# Patient Record
Sex: MALE | Race: WHITE
[De-identification: names, ages, dates, MRNs, and addresses within clinical notes are randomized per-mention and may not be internally consistent; named-entity substitution may affect disease eponyms.]

---

## 2021-01-23 ENCOUNTER — HOSPITAL ENCOUNTER (EMERGENCY)
Dept: HOSPITAL 57 - BURERS | Age: 86
Discharge: NURSING, REHAB OR LONG-TERM CARE | End: 2021-01-23
Payer: MEDICARE

## 2021-01-23 DIAGNOSIS — K21.9: ICD-10-CM

## 2021-01-23 DIAGNOSIS — Z79.899: ICD-10-CM

## 2021-01-23 DIAGNOSIS — N39.0: Primary | ICD-10-CM

## 2021-01-23 DIAGNOSIS — I25.10: ICD-10-CM

## 2021-01-23 DIAGNOSIS — Z86.73: ICD-10-CM

## 2021-01-23 LAB
BACTERIA UR QL AUTO: (no result) HPF
PROT UR STRIP.AUTO-MCNC: (no result) MG/DL
SP GR UR STRIP: 1.02 (ref 1–1.03)
WBC UR QL AUTO: (no result) HPF (ref 0–3)

## 2021-01-23 PROCEDURE — 81015 MICROSCOPIC EXAM OF URINE: CPT

## 2021-01-23 PROCEDURE — 87077 CULTURE AEROBIC IDENTIFY: CPT

## 2021-01-23 PROCEDURE — 87186 SC STD MICRODIL/AGAR DIL: CPT

## 2021-01-23 PROCEDURE — 87086 URINE CULTURE/COLONY COUNT: CPT

## 2021-01-23 PROCEDURE — 81003 URINALYSIS AUTO W/O SCOPE: CPT

## 2021-01-23 PROCEDURE — 99284 EMERGENCY DEPT VISIT MOD MDM: CPT

## 2021-02-04 ENCOUNTER — HOSPITAL ENCOUNTER (INPATIENT)
Dept: HOSPITAL 92 - ERS | Age: 86
LOS: 5 days | Discharge: HOSPICE | DRG: 280 | End: 2021-02-09
Attending: INTERNAL MEDICINE | Admitting: STUDENT IN AN ORGANIZED HEALTH CARE EDUCATION/TRAINING PROGRAM
Payer: MEDICARE

## 2021-02-04 VITALS — BODY MASS INDEX: 14.9 KG/M2

## 2021-02-04 DIAGNOSIS — Z88.8: ICD-10-CM

## 2021-02-04 DIAGNOSIS — F03.90: ICD-10-CM

## 2021-02-04 DIAGNOSIS — D64.9: ICD-10-CM

## 2021-02-04 DIAGNOSIS — Z86.73: ICD-10-CM

## 2021-02-04 DIAGNOSIS — K21.9: ICD-10-CM

## 2021-02-04 DIAGNOSIS — Z66: ICD-10-CM

## 2021-02-04 DIAGNOSIS — Z51.5: ICD-10-CM

## 2021-02-04 DIAGNOSIS — Z88.0: ICD-10-CM

## 2021-02-04 DIAGNOSIS — Z91.81: ICD-10-CM

## 2021-02-04 DIAGNOSIS — I21.4: Primary | ICD-10-CM

## 2021-02-04 DIAGNOSIS — E44.0: ICD-10-CM

## 2021-02-04 DIAGNOSIS — R53.81: ICD-10-CM

## 2021-02-04 DIAGNOSIS — R64: ICD-10-CM

## 2021-02-04 DIAGNOSIS — Z91.040: ICD-10-CM

## 2021-02-04 DIAGNOSIS — I25.10: ICD-10-CM

## 2021-02-04 DIAGNOSIS — M54.9: ICD-10-CM

## 2021-02-04 DIAGNOSIS — M48.50XA: ICD-10-CM

## 2021-02-04 DIAGNOSIS — G89.29: ICD-10-CM

## 2021-02-04 DIAGNOSIS — Z88.1: ICD-10-CM

## 2021-02-04 DIAGNOSIS — U07.1: ICD-10-CM

## 2021-02-04 DIAGNOSIS — Z95.1: ICD-10-CM

## 2021-02-04 LAB
ALBUMIN SERPL BCG-MCNC: 2.8 G/DL (ref 3.4–4.8)
ALP SERPL-CCNC: 72 U/L (ref 40–110)
ALT SERPL W P-5'-P-CCNC: 36 U/L (ref 8–55)
ANION GAP SERPL CALC-SCNC: 13 MMOL/L (ref 10–20)
APTT PPP: (no result) SEC (ref 22.9–36.1)
APTT PPP: 157.9 SEC (ref 22.9–36.1)
AST SERPL-CCNC: 41 U/L (ref 5–34)
BACTERIA UR QL AUTO: (no result) HPF
BASOPHILS # BLD AUTO: (no result) THOU/UL (ref 0–0.2)
BASOPHILS # BLD AUTO: 0 THOU/UL (ref 0–0.2)
BASOPHILS NFR BLD AUTO: (no result) % (ref 0–1)
BASOPHILS NFR BLD AUTO: 0.2 % (ref 0–1)
BILIRUB SERPL-MCNC: 0.6 MG/DL (ref 0.2–1.2)
BUN SERPL-MCNC: 23 MG/DL (ref 8.4–25.7)
CALCIUM SERPL-MCNC: 7.9 MG/DL (ref 7.8–10.44)
CHLORIDE SERPL-SCNC: 104 MMOL/L (ref 98–107)
CK MB SERPL-MCNC: 1.4 NG/ML (ref 0–6.6)
CK MB SERPL-MCNC: 12.3 NG/ML (ref 0–6.6)
CO2 SERPL-SCNC: 22 MMOL/L (ref 23–31)
CREAT CL PREDICTED SERPL C-G-VRATE: 0 ML/MIN (ref 70–130)
EOSINOPHIL # BLD AUTO: (no result) THOU/UL (ref 0–0.7)
EOSINOPHIL # BLD AUTO: 0.1 THOU/UL (ref 0–0.7)
EOSINOPHIL NFR BLD AUTO: (no result) % (ref 0–10)
EOSINOPHIL NFR BLD AUTO: 0.8 % (ref 0–10)
GLOBULIN SER CALC-MCNC: 3.1 G/DL (ref 2.4–3.5)
GLUCOSE SERPL-MCNC: 112 MG/DL (ref 83–110)
HGB BLD-MCNC: (no result) G/DL (ref 14–18)
HGB BLD-MCNC: 14.4 G/DL (ref 14–18)
INR PPP: 1.1
INR PPP: 2.7
LEUKOCYTE ESTERASE UR QL STRIP.AUTO: 75 LEU/UL
LIPASE SERPL-CCNC: 127 U/L (ref 8–78)
LYMPHOCYTES # BLD: (no result) THOU/UL (ref 1.2–3.4)
LYMPHOCYTES # BLD: 0.9 THOU/UL (ref 1.2–3.4)
LYMPHOCYTES NFR BLD AUTO: (no result) % (ref 21–51)
LYMPHOCYTES NFR BLD AUTO: 7 % (ref 21–51)
MCH RBC QN AUTO: (no result) PG (ref 27–31)
MCH RBC QN AUTO: 32.7 PG (ref 27–31)
MCV RBC AUTO: (no result) FL (ref 78–98)
MCV RBC AUTO: 98.5 FL (ref 78–98)
MONOCYTES # BLD AUTO: (no result) THOU/UL (ref 0.11–0.59)
MONOCYTES # BLD AUTO: 0.9 THOU/UL (ref 0.11–0.59)
MONOCYTES NFR BLD AUTO: (no result) % (ref 0–10)
MONOCYTES NFR BLD AUTO: 6.4 % (ref 0–10)
NEUTROPHILS # BLD AUTO: (no result) THOU/UL (ref 1.4–6.5)
NEUTROPHILS # BLD AUTO: 11.4 THOU/UL (ref 1.4–6.5)
NEUTROPHILS NFR BLD AUTO: (no result) % (ref 42–75)
NEUTROPHILS NFR BLD AUTO: 85.6 % (ref 42–75)
PLATELET # BLD AUTO: (no result) THOU/UL (ref 130–400)
PLATELET # BLD AUTO: 463 THOU/UL (ref 130–400)
POTASSIUM SERPL-SCNC: 4.1 MMOL/L (ref 3.5–5.1)
PROT UR STRIP.AUTO-MCNC: 30 MG/DL
PROTHROMBIN TIME: 14.6 SEC (ref 12–14.7)
PROTHROMBIN TIME: 29.4 SEC (ref 12–14.7)
RBC # BLD AUTO: (no result) MILL/UL (ref 4.7–6.1)
RBC # BLD AUTO: 4.4 MILL/UL (ref 4.7–6.1)
RBC UR QL AUTO: (no result) HPF (ref 0–3)
SODIUM SERPL-SCNC: 135 MMOL/L (ref 136–145)
SP GR UR STRIP: 1.02 (ref 1–1.04)
WBC # BLD AUTO: (no result) THOU/UL (ref 4.8–10.8)
WBC # BLD AUTO: 13.3 THOU/UL (ref 4.8–10.8)
WBC UR QL AUTO: (no result) HPF (ref 0–3)

## 2021-02-04 PROCEDURE — 71275 CT ANGIOGRAPHY CHEST: CPT

## 2021-02-04 PROCEDURE — U0005 INFEC AGEN DETEC AMPLI PROBE: HCPCS

## 2021-02-04 PROCEDURE — 81003 URINALYSIS AUTO W/O SCOPE: CPT

## 2021-02-04 PROCEDURE — 84484 ASSAY OF TROPONIN QUANT: CPT

## 2021-02-04 PROCEDURE — 96375 TX/PRO/DX INJ NEW DRUG ADDON: CPT

## 2021-02-04 PROCEDURE — 83690 ASSAY OF LIPASE: CPT

## 2021-02-04 PROCEDURE — 80048 BASIC METABOLIC PNL TOTAL CA: CPT

## 2021-02-04 PROCEDURE — 80053 COMPREHEN METABOLIC PANEL: CPT

## 2021-02-04 PROCEDURE — 86900 BLOOD TYPING SEROLOGIC ABO: CPT

## 2021-02-04 PROCEDURE — 87635 SARS-COV-2 COVID-19 AMP PRB: CPT

## 2021-02-04 PROCEDURE — 96366 THER/PROPH/DIAG IV INF ADDON: CPT

## 2021-02-04 PROCEDURE — 36415 COLL VENOUS BLD VENIPUNCTURE: CPT

## 2021-02-04 PROCEDURE — 83880 ASSAY OF NATRIURETIC PEPTIDE: CPT

## 2021-02-04 PROCEDURE — 93005 ELECTROCARDIOGRAM TRACING: CPT

## 2021-02-04 PROCEDURE — 82553 CREATINE MB FRACTION: CPT

## 2021-02-04 PROCEDURE — 85025 COMPLETE CBC W/AUTO DIFF WBC: CPT

## 2021-02-04 PROCEDURE — 93010 ELECTROCARDIOGRAM REPORT: CPT

## 2021-02-04 PROCEDURE — 87086 URINE CULTURE/COLONY COUNT: CPT

## 2021-02-04 PROCEDURE — 86850 RBC ANTIBODY SCREEN: CPT

## 2021-02-04 PROCEDURE — 85730 THROMBOPLASTIN TIME PARTIAL: CPT

## 2021-02-04 PROCEDURE — U0003 INFECTIOUS AGENT DETECTION BY NUCLEIC ACID (DNA OR RNA); SEVERE ACUTE RESPIRATORY SYNDROME CORONAVIRUS 2 (SARS-COV-2) (CORONAVIRUS DISEASE [COVID-19]), AMPLIFIED PROBE TECHNIQUE, MAKING USE OF HIGH THROUGHPUT TECHNOLOGIES AS DESCRIBED BY CMS-2020-01-R: HCPCS

## 2021-02-04 PROCEDURE — 96368 THER/DIAG CONCURRENT INF: CPT

## 2021-02-04 PROCEDURE — 85610 PROTHROMBIN TIME: CPT

## 2021-02-04 PROCEDURE — 71045 X-RAY EXAM CHEST 1 VIEW: CPT

## 2021-02-04 PROCEDURE — 87040 BLOOD CULTURE FOR BACTERIA: CPT

## 2021-02-04 PROCEDURE — 51701 INSERT BLADDER CATHETER: CPT

## 2021-02-04 PROCEDURE — 86901 BLOOD TYPING SEROLOGIC RH(D): CPT

## 2021-02-04 PROCEDURE — 96365 THER/PROPH/DIAG IV INF INIT: CPT

## 2021-02-04 PROCEDURE — 84443 ASSAY THYROID STIM HORMONE: CPT

## 2021-02-04 PROCEDURE — 81015 MICROSCOPIC EXAM OF URINE: CPT

## 2021-02-04 NOTE — RAD
PORTABLE CHEST:

2/4/21

 

HISTORY: 

Shortness of breath. 

 

Comparison 5/15/16 study.

 

Heart size is within normal limits. There are atherosclerotic changes of the aorta. Lungs show chroni
c change. No focal infiltrative process or signs of failure. Bones are diffusely demineralized. 

 

IMPRESSION: 

No active intrathoracic disease. 

 

POS: [place]

## 2021-02-04 NOTE — PDOC.HHP
Hospitalist HPI


chest pain


History of Present Illness: 





This is an 89-year-old male patient with a history of dementia, falls, back pa

in, coronary disease status post stent GERD and stroke who was transferred from 

his nursing home this afternoon account of chest pain.





Patient was also recently independent at home however he had a recent fall 

requiring back surgery and due to inability to stay independently was moved to a

nursing home after.  In [facility].





In the nursing home today he complained of chest pain as a result of which EMS 

was activated.


EMS noted he had a large anterior and posterior MI status contacted ED and Cath 

Lab was activated.  He was started on heparin drip route however on presentation

chest pain had resolved and was relatively stable.  Cardiology was consulted and

was reevaluated.  Noted his EKG showed reperfusion and plan was to continue on 

medical management.  He was not sent for Cath Lab.


On discussion with his familyadopted family, patient and family agree that no 

heroic measures will be taken and he would be DNR.





At the time of my evaluation patient was comfortable in bed noted he had pain 

earlier but denied any other movement.  He denied any shortness of breath 

palpitations or weakness.


His initial labs did show hemoglobin of 7 however this was thought to be wrong. 

Repeat labs showed hemoglobin of 14.4, WBC 13.3 and platelets of 436.  Chemistry

showed sodium of 135, bicarb 22, creatinine 0.63, glucose 112 and troponin of 

0.165.  His BNP was 248.  Chest x-ray showed no acute intrathoracic event.  CT a

showed pleural-based nodular densities with repeat CT recommended in 4 months.  

Findings were generally of chronic lung changes with no evidence of PE noted.


In the ED received azithromycin aspirin 300 mg rectally and heparin.


Hospitalist team was consulted to admit





Allergies/Adverse Reactions: 


                                        











Allergy/AdvReac Type Severity Reaction Status Date / Time


 


cephalexin Allergy   Verified 03/23/20 01:52


 


latex Allergy   Verified 03/23/20 01:52


 


levofloxacin [From Levaquin] Allergy   Verified 03/23/20 01:52


 


Penicillins Allergy   Verified 03/23/20 01:52


 


Statins-Hmg-Coa Reductase Allergy   Verified 02/05/21 03:10





Inhibitor     


 


tramadol Allergy   Verified 02/05/21 02:38











Home Medications: 


                                        











 Medication  Instructions  Recorded  Confirmed  Type


 


Acetaminophen W/ Codeine 1 tab PO Q4H PRN 02/05/21 02/05/21 History





[Acetaminophen/Codeine #3]    


 


Ascorbic Acid [Vitamin C] 1 tab PO DAILY 02/05/21 02/05/21 History


 


Cholecalciferol [Vitamin D3] 1 cap PO DAILY 02/05/21 02/05/21 History


 


Cyanocobalamin (Vitamin B-12) 2 tab PO DAILY 02/05/21 02/05/21 History





[B-12]    


 


Docusate [Colace] 1 cap PO DAILY 02/05/21 02/05/21 History


 


Lactobacillus [Floranex] 1 tab PO DAILY 02/05/21 02/05/21 History


 


Omeprazole 20 mg PO DAILY 02/05/21 02/05/21 History


 


Vitamin E 400 units PO DAILY 02/05/21 02/05/21 History


 


tiZANidine HCl [Tizanidine HCl] 4 mg PO Q6HR PRN 02/05/21 02/05/21 History











Past History: 


PMHx:dementia, falls, back pain, coronary disease status post stent GERD and 

stroke 





PSHx:Bardiac stent, back surgery   





FHx:None of significance   





Social:Leaves in nursing facility, no smoking, alcohol or drug history








Hospitalist HPI ROS


Constitutional: denies: fever, chills, sweats


Respiratory: denies: cough, shortness of breath, hemoptysis, SOB with excertion


Cardiovascular: reports: chest pain.  denies: palpitations, orthopnea


Gastrointestinal: denies: nausea (Since resolved), vomiting, abdominal pain


Musculoskeletal: reports: back pain, leg pain.  denies: neck pain, shoulder pain


Neurological: denies: weakness, numbness, incoordination


All other systems reviewed; all pertinent +/- noted in HPI/Subj





Hospitalist Exam


General Appearance: awake alert


Eye: PERRL, anicteric sclera


Heart: RRR, no murmur, no gallops, no rubs


Respiratory: CTAB, no wheezes, no rales, no ronchi


Extremities: no cyanosis, no clubbing, no edema


Neurological: cranial nerve grossly intact, no focal deficits


Psychiatric: normal affect, A&O x 3


Psychiatric - other findings: Sometimes slightly disoriented however





Hospitalist Results


Result Diagrams: 


                                 02/07/21 02:53





                                 02/07/21 02:53


Lab results: 


                             Laboratory Last Values











WBC  5.2 thou/uL (4.8-10.8)   02/04/21  17:04    


 


RBC  2.17 mill/uL (4.70-6.10)  L  02/04/21  17:04    


 


Hgb  7.0 g/dL (14.0-18.0)  L  02/04/21  17:04    


 


Hct  21.2 % (42.0-52.0)  L  02/04/21  17:04    


 


MCV  98.0 fL (78.0-98.0)   02/04/21  17:04    


 


MCH  32.3 pg (27.0-31.0)  H  02/04/21  17:04    


 


MCHC  32.9 g/dL (32.0-36.0)   02/04/21  17:04    


 


RDW  13.0 % (11.5-14.5)   02/04/21  17:04    


 


Plt Count  243 thou/uL (130-400)   02/04/21  17:04    


 


MPV  7.4 fL (7.4-10.4)   02/04/21  17:04    


 


Neutrophils %  67.5 % (42.0-75.0)   02/04/21  17:04    


 


Lymphocytes %  21.2 % (21.0-51.0)   02/04/21  17:04    


 


Monocytes %  9.8 % (0.0-10.0)   02/04/21  17:04    


 


Eosinophils %  1.2 % (0.0-10.0)   02/04/21  17:04    


 


Basophils %  0.3 % (0.0-1.0)   02/04/21  17:04    


 


Neutrophils #  3.5 thou/uL (1.40-6.50)   02/04/21  17:04    


 


Lymphocytes #  1.1 thou/uL (1.20-3.40)  L  02/04/21  17:04    


 


Monocytes #  0.5 thou/uL (0.11-0.59)   02/04/21  17:04    


 


Eosinophils #  0.1 thou/uL (0.0-0.7)   02/04/21  17:04    


 


Basophils #  0.0 thou/uL (0.0-0.2)   02/04/21  17:04    


 


PT  29.4 sec (12.0-14.7)  H  02/04/21  17:04    


 


INR  2.7   02/04/21  17:04    


 


APTT  Greater than  250.0 sec (22.9-36.1)  H*  02/04/21  17:04    


 


Sodium  135 mmol/L (136-145)  L  02/04/21  17:04    


 


Potassium  4.1 mmol/L (3.5-5.1)   02/04/21  17:04    


 


Chloride  104 mmol/L ()   02/04/21  17:04    


 


Carbon Dioxide  22 mmol/L (23-31)  L  02/04/21  17:04    


 


Anion Gap  13 mmol/L (10-20)   02/04/21  17:04    


 


BUN  23 mg/dL (8.4-25.7)   02/04/21  17:04    


 


Creatinine  0.63 mg/dL (0.7-1.3)  L  02/04/21  17:04    


 


Estimated GFR (MDRD)  Greater than  90   02/04/21  17:04    


 


Glucose  112 mg/dL ()  H  02/04/21  17:04    


 


Calcium  7.9 mg/dL (7.8-10.44)   02/04/21  17:04    


 


Total Bilirubin  0.6 mg/dL (0.2-1.2)   02/04/21  17:04    


 


AST  41 U/L (5-34)  H  02/04/21  17:04    


 


ALT  36 U/L (8-55)   02/04/21  17:04    


 


Alkaline Phosphatase  72 U/L ()   02/04/21  17:04    


 


CK-MB (CK-2)  1.4 ng/mL (0-6.6)   02/04/21  17:04    


 


Troponin I  0.165 ng/mL (< 0.028)  H  02/04/21  17:04    


 


B-Natriuretic Peptide  44.9 pg/mL (0-100)   02/04/21  17:04    


 


Serum Total Protein  5.9 g/dL (5.8-8.1)   02/04/21  17:04    


 


Albumin  2.8 g/dL (3.4-4.8)  L  02/04/21  17:04    


 


Globulin  3.1 g/dL (2.4-3.5)   02/04/21  17:04    


 


Albumin/Globulin Ratio  0.9 g/dL (1.2-2.2)  L  02/04/21  17:04    


 


Lipase  127 U/L (8-78)  H  02/04/21  17:04    


 


TSH 3rd Generation  1.2399 uIU/mL (0.35-4.94)   02/04/21  17:04    


 


Urine Color  Yellow  (Yellow)   02/04/21  17:14    


 


Urine Clarity  Clear  (Clear)   02/04/21  17:14    


 


Urine pH  6.0  (5.0-9.0)   02/04/21  17:14    


 


Ur Specific Gravity  1.025  (1.002-1.036)   02/04/21  17:14    


 


Urine Protein  30 mg/dL (Neg-Trace)  A  02/04/21  17:14    


 


Urine Glucose (UA)  Normal mg/dL (Negative)   02/04/21  17:14    


 


Urine Ketones  Negative mg/dL (Negative)   02/04/21  17:14    


 


Urine Blood  Negative  (Negative)   02/04/21  17:14    


 


Urine Nitrite  Negative  (Negative)   02/04/21  17:14    


 


Urine Bilirubin  Negative  (Negative)   02/04/21  17:14    


 


Urine Urobilinogen  2.0 mg/dL (Less than 2)  A  02/04/21  17:14    


 


Ur Leukocyte Esterase  75 Alice/uL (Negative)  A  02/04/21  17:14    


 


Urine RBC  4-6 HPF (0-3)  A  02/04/21  17:14    


 


Urine WBC  7-10 HPF (0-3)  A  02/04/21  17:14    


 


Ur Squamous Epith Cells  0-3 HPF (0-3)   02/04/21  17:14    


 


Urine Bacteria  1+ HPF (None Seen)  A  02/04/21  17:14    


 


Hyaline Casts  4-6 LPF (0-3)  A  02/04/21  17:14    


 


Blood Type  B POSITIVE   02/04/21  18:14    


 


Antibody Screen  NEGATIVE   02/04/21  18:05    


 


Crossmatch  See Detail   02/04/21  18:05    














Hospitalist H&P A/P


Plan: 





This is an 89-year-old male patient with a history of coronary artery disease, 

chronic low back pain who presents with chest pain diagnostic of MI.





STEMI


Continue on heparin


Received aspirinwe will continue


Monitoring telemetry


Cardiology following





-Chronic back pain


s/p recent back surgery


Current medications


PT





Dementia





Chronic debility


Patient has chronic health problems including dementia


Have PT evaluate








CODE STATUSDNR


DVT prophylaxistherapeutic level

## 2021-02-04 NOTE — PRG
DATE OF SERVICE:  02/04/2021



I spoke with friends, who are really functioning as Mr. Murrieta's family and also

have power-of-.  The patient's friends discussed that Mr. Murrieta has been

going downhill the last few months, losing weight.  He is in a nursing home.  He

tried to go to assisted living, but he was unable do that and had to go back to the

nursing home.  She indicates that the best options would be more comfort care.  The

patient is unable to really tell us about code status, right now is confused,

disoriented, but the friends indicated that the code status would more likely be in

line with the comfort care.  She did not think that he would want chest compressions

or intubation or aggressive care and in terms of that, we will try to treat him

aggressively medically. 







Job ID:  [id number]

## 2021-02-04 NOTE — CT
CT ANGIOGRAM THORAX WITH IV CONTRAST AND 3-D RECONSTRUCTIONS



CLINICAL INDICATION:

Right-sided chest pain/indigestion which started this morning. Abnormal EKG. 



COMPARISON:

None



FINDINGS:

Pulmonary arteries: No filling defects are seen in the pulmonary arteries to suggest a pulmonary embo
chuy.





Aorta: Not well opacified, the thoracic aorta is normal in caliber and tortuous. Vascular calcificati
ons are seen in the thoracic aorta.



Lungs: Biapical pleural parenchymal scarring is present which is partially calcified. Scattered linea
r densities are seen posteriorly which may be related to atelectasis or scarring.



There is a prominent pleural-based nodular density measuring 11 mm adjacent to the upper portion of t
he major fissure in the right upper lobe. There are also a few mildly prominent pleural-based

nodular densities along the major fissure further inferiorly largest measuring approximately 10 mm. A
djacent reticulonodular densities are seen adjacent to the pleural-based nodular densities lower

aspect of the major fissure. Exact etiology for these nodular densities is uncertain and short interv
al follow-up is recommended. Small nodular density is seen along the left major fissure measuring

0.8 cm and may represent a small intrapleural lymph node.



No pleural effusion is identified.



Mediastinum: No enlarged mediastinal lymph nodes are seen by CT size criteria. Few small calcified me
diastinal lymph nodes are present. Prominent coronary artery calcifications are visualized.



Thyroid gland: Grossly normal in appearance.



Osseous structures: Multilevel degenerative changes in the thoracic spine with vertebroplasty changes
 seen involving mid thoracic vertebral bodies. Mild wedge-shaped compression fractures are seen

involving the T10, T12, and L1 vertebral bodies of indeterminate age, these fractures were probably p
resent on radiographs in 2017.



Chest wall: No abnormality visualized.





Upper abdomen: Prominent vascular calcifications are seen in the region of the vascular structures in
 the upper abdomen. 



IMPRESSION:



1. Pleural-based nodular densities along the major fissure which cannot be characterized as a intrapl
eural lymph nodes. Follow-up CT thorax in 4 months is recommended.

2. Scattered mildly prominent interstitial densities are seen posteriorly in the right lower lobe and
 lateral aspect of the right upper lobe which could relate to mild chronic lung changes, but

infectious or inflammatory process is a possibility.

3. Mild chronic lung changes.

4. No CT evidence of a pulmonary embolus.

5. Prominent vascular calcifications. 

6. Remote wedge-shaped compression fractures lower thoracic spine and involving the L1 vertebral body
.



Reported By: Juvenal Brumfield 

Electronically Signed:  2/4/2021 6:10 PM

## 2021-02-05 LAB
ANION GAP SERPL CALC-SCNC: 14 MMOL/L (ref 10–20)
BASOPHILS # BLD AUTO: 0 THOU/UL (ref 0–0.2)
BASOPHILS NFR BLD AUTO: 0.3 % (ref 0–1)
BUN SERPL-MCNC: 20 MG/DL (ref 8.4–25.7)
CALCIUM SERPL-MCNC: 8.6 MG/DL (ref 7.8–10.44)
CHLORIDE SERPL-SCNC: 104 MMOL/L (ref 98–107)
CO2 SERPL-SCNC: 23 MMOL/L (ref 23–31)
CREAT CL PREDICTED SERPL C-G-VRATE: 65 ML/MIN (ref 70–130)
EOSINOPHIL # BLD AUTO: 0.1 THOU/UL (ref 0–0.7)
EOSINOPHIL NFR BLD AUTO: 0.7 % (ref 0–10)
GLUCOSE SERPL-MCNC: 99 MG/DL (ref 83–110)
HGB BLD-MCNC: 15 G/DL (ref 14–18)
LYMPHOCYTES # BLD: 1.6 THOU/UL (ref 1.2–3.4)
LYMPHOCYTES NFR BLD AUTO: 17 % (ref 21–51)
MCH RBC QN AUTO: 32.1 PG (ref 27–31)
MCV RBC AUTO: 96.3 FL (ref 78–98)
MONOCYTES # BLD AUTO: 0.8 THOU/UL (ref 0.11–0.59)
MONOCYTES NFR BLD AUTO: 8.8 % (ref 0–10)
NEUTROPHILS # BLD AUTO: 6.9 THOU/UL (ref 1.4–6.5)
NEUTROPHILS NFR BLD AUTO: 73.2 % (ref 42–75)
PLATELET # BLD AUTO: 503 THOU/UL (ref 130–400)
POTASSIUM SERPL-SCNC: 3.9 MMOL/L (ref 3.5–5.1)
RBC # BLD AUTO: 4.68 MILL/UL (ref 4.7–6.1)
SARS-COV-2 RNA RESP QL NAA+PROBE: DETECTED
SODIUM SERPL-SCNC: 137 MMOL/L (ref 136–145)
WBC # BLD AUTO: 9.5 THOU/UL (ref 4.8–10.8)

## 2021-02-05 RX ADMIN — ASPIRIN SCH MG: 81 TABLET ORAL at 08:16

## 2021-02-05 NOTE — PDOC.HOSPP
- Subjective


Encounter Date: 02/05/21


Encounter Time: 13:58


Subjective: 


Mr. Murrieta was seen today in follow-up of NSTEMI. He does not have any 

complaints. He denies chest pain or trouble breathing. He says he just wants to 

get out of this hospital and be with family.





- Objective


Vital Signs & Weight: 


                             Vital Signs (12 hours)











  Temp Pulse Resp BP Pulse Ox


 


 02/05/21 12:15  96.7 F L  69  18  112/57 L  100


 


 02/05/21 08:20  97.8 F  76  16  139/70  100


 


 02/05/21 08:16      100


 


 02/05/21 04:00  98.3 F  62  16  142/70 H  100








                                     Weight











Admit Weight                   113 lb


 


Weight                         113 lb














Result Diagrams: 


                                 02/05/21 02:11





                                 02/05/21 02:11





Hospitalist ROS





- Medication


Medications: 


Active Medications











Generic Name Dose Route Start Last Admin





  Trade Name Freq  PRN Reason Stop Dose Admin


 


Aspirin  81 mg  02/05/21 09:00  02/05/21 08:16





  Aspirin 81 Mg Enteric Coated Tablet  PO   81 mg





  DAILY ERIKA   Administration


 


Enoxaparin Sodium  50 mg  02/05/21 09:00  02/05/21 09:40





  Enoxaparin Sodium 60 Mg/0.6 Ml Syringe  SC   50 mg





  0900,2100 ERIKA   Administration


 


Famotidine  20 mg  02/05/21 09:00  02/05/21 09:40





  Famotidine 20 Mg Tab  PO   20 mg





  BID ERIKA   Administration














Hospitalist Exam


Vitals: 


                             Vital Signs (12 hours)











  Temp Pulse Resp BP Pulse Ox


 


 02/05/21 12:15  96.7 F L  69  18  112/57 L  100


 


 02/05/21 08:20  97.8 F  76  16  139/70  100


 


 02/05/21 08:16      100


 


 02/05/21 04:00  98.3 F  62  16  142/70 H  100








                                     Weight











Admit Weight                   113 lb


 


Weight                         113 lb














General Appearance: NAD


General - other findings: very thin and frail in appearance


Eye: PERRL, anicteric sclera


Heart: RRR, no murmur, no gallops, no rubs, normal peripheral pulses


Respiratory: CTAB, no wheezes, no rales, no ronchi, normal chest expansion, no 

tachypnea, normal percussion


Gastrointestinal: soft, non-tender, non-distended, normal bowel sounds, no 

palpable masses


Extremities: no cyanosis, no edema





Hosp A/P


(1) NSTEMI (non-ST elevated myocardial infarction)


Code(s): I21.4 - NON-ST ELEVATION (NSTEMI) MYOCARDIAL INFARCTION   Status: Acute

  





(2) CAD (coronary artery disease)


Code(s): I25.10 - ATHSCL HEART DISEASE OF NATIVE CORONARY ARTERY W/O ANG PCTRS  

Status: Chronic   





(3) Compression fracture of vertebra


Code(s): M48.50XA - COLLAPSED VERTEBRA, NEC, SITE UNSP, INIT   Status: Chronic  







(4) Physical deconditioning


Code(s): R53.81 - OTHER MALAISE   Status: Chronic   





- Plan





* NSTEMI and CAD- agree with conservative management, given his advanced age and

  frail status


* Continue Plavix aspirin Heparin , and consider adding a beta-blocker


* Chronic back pain- from vertebral fractures- continue conservative management


* Palliative Care Consult

## 2021-02-05 NOTE — CON
DATE OF CONSULTATION:  02/04/2021



REASON FOR CONSULTATION:  Acute myocardial infarction.



HISTORY OF PRESENT ILLNESS:  Mr. Murrieat is an 89-year-old gentleman.  He was

brought from a local nursing home.  The patient apparently was recently diagnosed

with COVID near the end of last month by one verbal report, it was from the 25th and

today is the 4th of February.  The patient was found to be having an acute

myocardial infarction and was brought to this institution.  The initial EKG showed

severe ST-elevation.  He was given heparin intravenously.  The ST segments have gone

back to baseline after he received heparin.  The patient is not having chest pain

now.  This "hurts all over." 



The past history is obtained mostly from the medical records.  He is unable to give

me much history.  He says he just hurts all over.  The previous notes indicate that

he does have coronary artery disease with a stent placed in 2011.  The details of

that are not available.  Also has a history of "small stroke". 



His overall health is unknown to me.  There is also a history of a compression

fracture. 



He was recently in the emergency room with urinary tract infection, which was

treated. 



Medications at this point are not known.



SOCIAL HISTORY:  The patient tells me he does not have any family, "he is a [place]

war  that is all he tells me." 



ALLERGIES:  TO CEPHALEXIN, LATEX, LEVOFLOXACIN, AND PENICILLIN.



PHYSICAL EXAMINATION:

GENERAL:  This is a really very underweight, cachectic appearing gentleman, looks

chronically ill. 

VITAL SIGNS:  His blood pressure was in the 100 systolic range.  His pulse in the

80s. 

NECK:  Veins are not distended. 

LUNGS:  Clear anteriorly, laterally. 

CARDIAC:  I do not hear murmur, rub, or gallop. 

ABDOMEN:  Underweight. 

EXTREMITIES:  Warm and dry.  There is no clubbing, cyanosis, or edema.  He does have

femoral pulses. 



PERTINENT LABORATORY DATA:  His hemoglobin is 7, it was recently 16 just a few days

ago. 



EKG showed ST-elevation initially in inferior walls with ST-depression in the

anterior walls compatible with a large infarction.  Follow up EKG shows marked

improvement in the ST segments. 



ASSESSMENT:  

1. Acute myocardial infarction, it appears likely that he reperfused with heparin.

2. Severe anemia.

3. Recent COVID infection.

4. Appears cachectic, appears chronically ill.



PLAN:  During this dictation actually it has just been texted if there is a family

available, can get further guidance about code status. 



He is on heparin. 



He will receive packed red blood cells. 



Prognosis guarded to poor in this unfortunate elderly gentleman with multiple

medical problems. 







Job ID:  [id number]

## 2021-02-05 NOTE — PQF
CLINICAL DOCUMENTATION CLARIFICATION FORM:









Dear Dr. MEGA MONTOYA                     Date: 2-5-21

Please exercise your independent, professional judgment in responding to the 
clarification form. 

Clinical indicators are provided on the bottom of this form for your review.



Please check appropriate box(es):

[X  ] Protein Calorie Malnutrition:    [  ] Mild      [ X ] Moderate   [  ] 
Severe   

[  ] Other Malnutrition (please specify) 
_________________________________________

[  ] Other diagnosis ___________

[  ] Unable to determine



In addition, please specify:

Present on Admission (POA):  [ X] Yes             [  ] No             [  ] 
Unable to determine



For continuity of documentation, please document condition throughout progress 
notes and discharge summary.  Thank You.



To be completed by CDI/Coding staff for physician review: 

CLINICAL INDICATORS - SIGNS / SYMPTOMS / LABS    / RESULTS AND LOCATION IN MR:



CONSULT NOTE DR. JOHNSON 2-4-21:  HAS BEEN [place] [facility] THE LAST FEW MONTHS, 
LOSING WEIGHT.  HE IS IN A NURSING HOME 



CONSULT NOTE DR. JOHNSON 2-4-21:   ACUTE MI, SEVERE ANEMIA,  RECENT COVID 
INFECTION, APPEARS CACHECTIC, APPEARS CHRONICALLY ILL.



DIETICIAN CONSULT 2-5-21:   Patient is COVID+. Ns home resident with dementia, 
and A&Ox3. 

Per admit records, noted 10% weight loss in 4 years. No recent wt hx documented 
to compare.



DIETICIAN CONSULT 2-5-21:   BMI:   14.9



RISK FACTORS    / RESULTS AND LOCATION IN MR:



CONSULT NOTE DR. JOHNSON 2-4-21:  HAS BEEN [facility] [facility] THE LAST FEW MONTHS, 
LOSING WEIGHT.  HE IS IN A NURSING HOME



TREATMENT    / RESULTS AND LOCATION IN MR:



DIETICIAN CONSULT 2-5-21:   



1. Recommend liberalizing diet to Regular diet to encourage PO intake, Heart 
Healthy diet is appropriate long term.

2. Recommend Ensure Enlive TID.

3. Monitor labs and electrolytes.



Moderate Malnutrition (in acute illness)

   Energy Intake: <75% of estimated energy requirement for > 7 days

   Weight Loss:  1-2%/1 week; 5%/ 1 month; 7.5%/3 months

   Other: mild body fat loss; mild muscle mass loss; mild fluid accumulation; 

Severe Malnutrition (in acute illness)

   Energy Intake: = 50% of estimated energy requirement for = 5 days

   Weight Loss: >2%/1 week; >5%/1 month; >7.5%/3 months

   Other: moderate body fat loss; moderate muscle mass loss; moderate- severe 
fluid accumulation; measurably reduced  strength

Moderate Malnutrition (in chronic illness)

   Energy Intake: <75% of estimated energy requirement for =1 month

   Weight Loss: 5%/1 month; 7.5%/3 months; 10%/6 months; 20%/1 year

   Other: mild body fat loss; mild muscle mass loss; mild fluid accumulation

Severe Malnutrition (in chronic illness)

   Energy Intake: =75% of estimated energy requirement for =1 month

   Weight Loss: >5%/1 month; >7.5%/3 months; >10%/6 months; >20%/1 year

   Other: severe body fat loss; severe muscle mass loss; severe fluid 
accumulation; measurably reduced  strength





CDS Signature:   Maile Paulino       Phone #:  [phone]            Date: 
2-5-21



                 This is a permanent part of the Medical Record

[facility]

## 2021-02-06 RX ADMIN — ASPIRIN SCH MG: 81 TABLET ORAL at 07:59

## 2021-02-06 NOTE — PDOC.HOSPP
- Subjective


Encounter Date: 02/06/21


Subjective: 


seems to be doing well, he was working with PT.





- Objective


Vital Signs & Weight: 


                             Vital Signs (12 hours)











  Temp Pulse Resp BP Pulse Ox


 


 02/06/21 08:00  96.5 F L  71  19  167/81 H  97


 


 02/06/21 03:54  97.6 F  66  14  141/73 H  100


 


 02/06/21 00:00  97.1 F L  70  16  136/61  97








                                     Weight











Admit Weight                   113 lb


 


Weight                         113 lb














I&O: 


                                        











 02/05/21 02/06/21 02/07/21





 06:59 06:59 06:59


 


Intake Total  540 


 


Output Total  1125 


 


Balance  -585 











Result Diagrams: 


                                 02/05/21 02:11





                                 02/05/21 02:11





Hospitalist ROS





- Medication


Medications: 


Active Medications











Generic Name Dose Route Start Last Admin





  Trade Name Freq  PRN Reason Stop Dose Admin


 


Aspirin  81 mg  02/05/21 09:00  02/06/21 07:59





  Aspirin 81 Mg Enteric Coated Tablet  PO   81 mg





  DAILY ERIKA   Administration


 


Carvedilol  1.56 mg  02/05/21 17:00  02/06/21 07:58





  Carvedilol 3.125 Mg Tab  PO   1.56 mg





  BID-WM ERIKA   Administration


 


Clopidogrel Bisulfate  75 mg  02/06/21 09:00  02/06/21 07:59





  Clopidogrel Bisulfate 75 Mg Tab  PO   75 mg





  DAILY ERIKA   Administration


 


Enoxaparin Sodium  50 mg  02/05/21 09:00  02/06/21 07:58





  Enoxaparin Sodium 60 Mg/0.6 Ml Syringe  SC   50 mg





  0900,2100 ERIKA   Administration


 


Famotidine  20 mg  02/05/21 09:00  02/06/21 07:59





  Famotidine 20 Mg Tab  PO   20 mg





  BID ERIKA   Administration














Hospitalist Exam


Vitals: 


                             Vital Signs (12 hours)











  Temp Pulse Resp BP Pulse Ox


 


 02/06/21 08:00  96.5 F L  71  19  167/81 H  97


 


 02/06/21 03:54  97.6 F  66  14  141/73 H  100


 


 02/06/21 00:00  97.1 F L  70  16  136/61  97








                                     Weight











Admit Weight                   113 lb


 


Weight                         113 lb














General Appearance: NAD


Eye: PERRL


ENT: normocephalic atraumatic, no oropharyngeal lesions


Neck: supple, symmetric, no JVD


Heart: RRR, no murmur, no gallops


Respiratory: CTAB, no wheezes, no rales, no ronchi


Extremities: no cyanosis


Skin: normal turgor





Hosp A/P


(1) NSTEMI (non-ST elevated myocardial infarction)


Code(s): I21.4 - NON-ST ELEVATION (NSTEMI) MYOCARDIAL INFARCTION   Status: Acute

  





(2) CAD (coronary artery disease)


Code(s): I25.10 - ATHSCL HEART DISEASE OF NATIVE CORONARY ARTERY W/O ANG PCTRS  

Status: Chronic   





(3) Compression fracture of vertebra


Code(s): M48.50XA - COLLAPSED VERTEBRA, NEC, SITE UNSP, INIT   Status: Chronic  







(4) Physical deconditioning


Code(s): R53.81 - OTHER MALAISE   Status: Chronic   





- Plan





plan for today 2/6





patient seemed to be doing better, he is on Lovenox BID, coreg, ASA and Plavix.


BP a bit up, I will keep on monitoring.


we will continue to follow Cardiology recommendation.

## 2021-02-06 NOTE — EKG
Test Reason : 

Blood Pressure : ***/*** mmHG

Vent. Rate : 073 BPM     Atrial Rate : 073 BPM

   P-R Int : 150 ms          QRS Dur : 090 ms

    QT Int : 422 ms       P-R-T Axes : 036 021 072 degrees

   QTc Int : 464 ms

 

Normal sinus rhythm

Low voltage QRS

Cannot rule out Anterior infarct , age undetermined

Abnormal ECG

 

Confirmed by ROHINI MENARD ([id number]),  DESIRAE MONROE ([id number]) on 2/6/2021 6:40:25 PM

 

Referred By:             Confirmed By:ROHINI MENARD

## 2021-02-07 LAB
ANION GAP SERPL CALC-SCNC: 11 MMOL/L (ref 10–20)
BASOPHILS # BLD AUTO: 0.1 THOU/UL (ref 0–0.2)
BASOPHILS NFR BLD AUTO: 1 % (ref 0–1)
BUN SERPL-MCNC: 16 MG/DL (ref 8.4–25.7)
CALCIUM SERPL-MCNC: 8.8 MG/DL (ref 7.8–10.44)
CHLORIDE SERPL-SCNC: 103 MMOL/L (ref 98–107)
CO2 SERPL-SCNC: 26 MMOL/L (ref 23–31)
COMM CRITICAL RESULTS DOC: (no result)
CREAT CL PREDICTED SERPL C-G-VRATE: 64 ML/MIN (ref 70–130)
EOSINOPHIL # BLD AUTO: 0.2 THOU/UL (ref 0–0.7)
EOSINOPHIL NFR BLD AUTO: 3.4 % (ref 0–10)
GLUCOSE SERPL-MCNC: 92 MG/DL (ref 83–110)
HGB BLD-MCNC: 15.5 G/DL (ref 14–18)
HGB BLD-MCNC: 15.8 G/DL (ref 14–18)
LYMPHOCYTES # BLD: 1.2 THOU/UL (ref 1.2–3.4)
LYMPHOCYTES NFR BLD AUTO: 18.1 % (ref 21–51)
MCH RBC QN AUTO: 31.7 PG (ref 27–31)
MCV RBC AUTO: 96.4 FL (ref 78–98)
MONOCYTES # BLD AUTO: 0.9 THOU/UL (ref 0.11–0.59)
MONOCYTES NFR BLD AUTO: 12.8 % (ref 0–10)
NEUTROPHILS # BLD AUTO: 4.4 THOU/UL (ref 1.4–6.5)
NEUTROPHILS NFR BLD AUTO: 64.7 % (ref 42–75)
PLATELET # BLD AUTO: 531 THOU/UL (ref 130–400)
PLATELET # BLD AUTO: 544 THOU/UL (ref 130–400)
POTASSIUM SERPL-SCNC: 4.7 MMOL/L (ref 3.5–5.1)
RBC # BLD AUTO: 4.88 MILL/UL (ref 4.7–6.1)
SODIUM SERPL-SCNC: 135 MMOL/L (ref 136–145)
TROPONIN I SERPL DL<=0.01 NG/ML-MCNC: 1.13 NG/ML (ref ?–0.03)
WBC # BLD AUTO: 6.8 THOU/UL (ref 4.8–10.8)

## 2021-02-07 RX ADMIN — ASPIRIN SCH MG: 81 TABLET ORAL at 09:45

## 2021-02-07 NOTE — PDOC.HOSPP
- Subjective


Encounter Date: 02/07/21


Subjective: 


feels well





- Objective


Vital Signs & Weight: 


                             Vital Signs (12 hours)











  Temp Pulse Resp BP Pulse Ox


 


 02/07/21 12:00  97.3 F L  80  18  138/78  96


 


 02/07/21 07:54  95.9 F L  80  22 H  134/60  100


 


 02/07/21 04:00  98.5 F  70  16  136/67  98








                                     Weight











Admit Weight                   113 lb


 


Weight                         113 lb














I&O: 


                                        











 02/06/21 02/07/21 02/08/21





 06:59 06:59 06:59


 


Intake Total 540 540 


 


Output Total 1125 450 


 


Balance -585 90 











Result Diagrams: 


                                 02/07/21 02:53





                                 02/07/21 02:53





Hospitalist ROS





- Medication


Medications: 


Active Medications











Generic Name Dose Route Start Last Admin





  Trade Name Freq  PRN Reason Stop Dose Admin


 


Aspirin  81 mg  02/05/21 09:00  02/07/21 09:45





  Aspirin 81 Mg Enteric Coated Tablet  PO   81 mg





  DAILY ERIKA   Administration


 


Carvedilol  1.56 mg  02/05/21 17:00  02/07/21 09:44





  Carvedilol 3.125 Mg Tab  PO   1.56 mg





  BID-WM ERIKA   Administration


 


Clopidogrel Bisulfate  75 mg  02/06/21 09:00  02/07/21 09:45





  Clopidogrel Bisulfate 75 Mg Tab  PO   75 mg





  DAILY ERIKA   Administration


 


Enoxaparin Sodium  50 mg  02/05/21 09:00  02/07/21 09:45





  Enoxaparin Sodium 60 Mg/0.6 Ml Syringe  SC  02/08/21 09:00  50 mg





  0900,2100 ERIKA   Administration


 


Famotidine  20 mg  02/05/21 09:00  02/07/21 09:44





  Famotidine 20 Mg Tab  PO   20 mg





  BID ERIKA   Administration














Hospitalist Exam


Vitals: 


                             Vital Signs (12 hours)











  Temp Pulse Resp BP Pulse Ox


 


 02/07/21 12:00  97.3 F L  80  18  138/78  96


 


 02/07/21 07:54  95.9 F L  80  22 H  134/60  100


 


 02/07/21 04:00  98.5 F  70  16  136/67  98








                                     Weight











Admit Weight                   113 lb


 


Weight                         113 lb














General Appearance: NAD


Eye: PERRL, anicteric sclera


ENT: normocephalic atraumatic, no oropharyngeal lesions


Neck: supple, symmetric, no JVD, no thyromegaly


Heart: RRR, no murmur, no gallops, no rubs


Respiratory: CTAB, no wheezes, no rales, no ronchi


Gastrointestinal: soft, non-tender, non-distended


Extremities: no cyanosis, no clubbing





Hosp A/P


(1) NSTEMI (non-ST elevated myocardial infarction)


Code(s): I21.4 - NON-ST ELEVATION (NSTEMI) MYOCARDIAL INFARCTION   Status: Acute

  





(2) CAD (coronary artery disease)


Code(s): I25.10 - ATHSCL HEART DISEASE OF NATIVE CORONARY ARTERY W/O ANG PCTRS  

Status: Chronic   





(3) Compression fracture of vertebra


Code(s): M48.50XA - COLLAPSED VERTEBRA, NEC, SITE UNSP, INIT   Status: Chronic  







(4) Physical deconditioning


Code(s): R53.81 - OTHER MALAISE   Status: Chronic   





- Plan





plan for today 2/6





patient seemed to be doing better, he is on Lovenox BID, coreg, ASA and Plavix.


BP a bit up, I will keep on monitoring.


we will continue to follow Cardiology recommendation.








plan for today 2/7





Patient seemed to be doing the same, I spoke with him about needing a heart cath

but he seems to be in denial, I spoke with his MPOA and it seems that he does 

not want anything to be done and a referral for home hospice was initiated.


When ready he will go with hospice.He probably will need to stay in isolation 

for another week approximately since he was diagnosed with Covid 19 on Jan 30 th.


In am we can stop his Lovenox, and continue with Coreg and aspirin, and Plavix.

## 2021-02-08 LAB
ANION GAP SERPL CALC-SCNC: 10 MMOL/L (ref 10–20)
BUN SERPL-MCNC: 17 MG/DL (ref 8.4–25.7)
CALCIUM SERPL-MCNC: 8.8 MG/DL (ref 7.8–10.44)
CHLORIDE SERPL-SCNC: 101 MMOL/L (ref 98–107)
CO2 SERPL-SCNC: 30 MMOL/L (ref 23–31)
CREAT CL PREDICTED SERPL C-G-VRATE: 60 ML/MIN (ref 70–130)
GLUCOSE SERPL-MCNC: 111 MG/DL (ref 83–110)
POTASSIUM SERPL-SCNC: 3.6 MMOL/L (ref 3.5–5.1)
SODIUM SERPL-SCNC: 137 MMOL/L (ref 136–145)

## 2021-02-08 RX ADMIN — ASPIRIN SCH MG: 81 TABLET ORAL at 09:18

## 2021-02-08 NOTE — PDOC.HOSPP
- Subjective


Encounter Date: 02/08/21


Subjective: 


Well-appearing in no acute distress





- Objective


Vital Signs & Weight: 


                             Vital Signs (12 hours)











  Temp Pulse Resp BP Pulse Ox


 


 02/08/21 13:00  97.6 F  89  17  94/54 L  100


 


 02/08/21 09:00  97.3 F L  96  18  108/53 L  100


 


 02/08/21 08:00      98


 


 02/08/21 04:24  97.9 F  67  16  136/60  98








                                     Weight











Admit Weight                   113 lb


 


Weight                         113 lb














I&O: 


                                        











 02/07/21 02/08/21 02/09/21





 06:59 06:59 06:59


 


Intake Total 540 120 


 


Output Total 450 800 


 


Balance 90 -680 











Result Diagrams: 


                                 02/07/21 02:53





                                 02/08/21 10:43





Hospitalist ROS





- Medication


Medications: 


Active Medications











Generic Name Dose Route Start Last Admin





  Trade Name Freq  PRN Reason Stop Dose Admin


 


Aspirin  81 mg  02/05/21 09:00  02/08/21 09:18





  Aspirin 81 Mg Enteric Coated Tablet  PO   81 mg





  DAILY ERIKA   Administration


 


Carvedilol  3.125 mg  02/07/21 17:00  02/08/21 09:18





  Carvedilol 3.125 Mg Tab  PO   3.125 mg





  BID-WM ERIKA   Administration


 


Clopidogrel Bisulfate  75 mg  02/06/21 09:00  02/08/21 09:19





  Clopidogrel Bisulfate 75 Mg Tab  PO   75 mg





  DAILY ERIKA   Administration


 


Famotidine  20 mg  02/05/21 09:00  02/08/21 09:19





  Famotidine 20 Mg Tab  PO   20 mg





  BID ERIKA   Administration














Hospitalist Exam


Vitals: 


                             Vital Signs (12 hours)











  Temp Pulse Resp BP Pulse Ox


 


 02/08/21 13:00  97.6 F  89  17  94/54 L  100


 


 02/08/21 09:00  97.3 F L  96  18  108/53 L  100


 


 02/08/21 08:00      98


 


 02/08/21 04:24  97.9 F  67  16  136/60  98








                                     Weight











Admit Weight                   113 lb


 


Weight                         113 lb














General Appearance: NAD


Eye: PERRL


ENT: normocephalic atraumatic


Neck: supple, symmetric, no JVD


Heart: RRR, no murmur, no gallops


Respiratory: CTAB, no wheezes, no rales


Gastrointestinal: soft, non-tender, non-distended





Hosp A/P


(1) NSTEMI (non-ST elevated myocardial infarction)


Code(s): I21.4 - NON-ST ELEVATION (NSTEMI) MYOCARDIAL INFARCTION   Status: Acute

  





(2) CAD (coronary artery disease)


Code(s): I25.10 - ATHSCL HEART DISEASE OF NATIVE CORONARY ARTERY W/O ANG PCTRS  

Status: Chronic   





(3) Compression fracture of vertebra


Code(s): M48.50XA - COLLAPSED VERTEBRA, NEC, SITE UNSP, INIT   Status: Chronic  







(4) Physical deconditioning


Code(s): R53.81 - OTHER MALAISE   Status: Chronic   





- Plan





plan for today 2/6





patient seemed to be doing better, he is on Lovenox BID, coreg, ASA and Plavix.


BP a bit up, I will keep on monitoring.


we will continue to follow Cardiology recommendation.








plan for today 2/7





Patient seemed to be doing the same, I spoke with him about needing a heart cath

but he seems to be in denial, I spoke with his MPOA and it seems that he does 

not want anything to be done and a referral for home hospice was initiated.


When ready he will go with hospice.He probably will need to stay in isolation 

for another week approximately since he was diagnosed with Covid 19 on Jan 30 th.


In am we can stop his Lovenox, and continue with Coreg and aspirin, and Plavix.





Plan for today 2/8





Patient will be going tomorrow with home hospice.


For the past 24 hours his blood pressure has been on the low side, when I went 

to see him the cardiology nurse practitioner was talking to him and we had a 

discussion about adjustment of his blood pressure medication which she will do.


Lovenox was stopped today, continue same management anticipating discharge t

omorrow in the morning.

## 2021-02-08 NOTE — PDOC.CPN
- Subjective


Date: 02/08/21


Time: 14:00


Interval history: 





No overnight events, patient sitting up in bed talking, just got out of the 

shower. He denies any chest pain or shortness of breath, his BP has been running

on the low side. He denies any dizziness. 





- Review of Systems


General: denies: fever/chills, weight/appetite/sleep changes, night sweats, fat

igue


Respiratory: denies: cough, congestion, shortness of breath, exercise 

intolerance


Cardiovascular: denies: chest pain, palpitation, edema, paroxysmal nocturnal 

dyspnea, orthopnea


Gastrointestinal: denies: nausea, vomiting, diarrhea, constipation, abd pain, GI

bleeding


Musculoskeletal: denies: pain, tenderness, stiffness, swelling, 

arthritis/arthralgias


Neurological: denies: numbness, syncope, seizure, weakness





- Objective


Allergies/Adverse Reactions: 


                                    Allergies











Allergy/AdvReac Type Severity Reaction Status Date / Time


 


cephalexin Allergy   Verified 03/23/20 01:52


 


latex Allergy   Verified 03/23/20 01:52


 


levofloxacin [From Levaquin] Allergy   Verified 03/23/20 01:52


 


Penicillins Allergy   Verified 03/23/20 01:52


 


Statins-Hmg-Coa Reductase Allergy   Verified 02/05/21 03:10





Inhibitor     


 


tramadol Allergy   Verified 02/05/21 02:38











Visit Medications: 


                               Current Medications





Acetaminophen (Acetaminophen 325 Mg Tab)  650 mg PO Q4H PRN


   PRN Reason: Headache/Fever or Pain


Aspirin (Aspirin 81 Mg Enteric Coated Tablet)  81 mg PO DAILY [facility]


   Last Admin: 02/08/21 09:18 Dose:  81 mg


   Documented by: 


Carvedilol (Carvedilol 3.125 Mg Tab)  3.125 mg PO BID-[facility]


   Last Admin: 02/08/21 09:18 Dose:  3.125 mg


   Documented by: 


Clopidogrel Bisulfate (Clopidogrel Bisulfate 75 Mg Tab)  75 mg PO DAILY [facility]


   Last Admin: 02/08/21 09:19 Dose:  75 mg


   Documented by: 


Famotidine (Famotidine 20 Mg Tab)  20 mg PO BID [facility]


   Last Admin: 02/08/21 09:19 Dose:  20 mg


   Documented by: 








Vital Signs & Weight: 


                                   Vital Signs











  Temp Pulse Resp BP Pulse Ox


 


 02/08/21 13:00  97.6 F  89  17  94/54 L  100


 


 02/08/21 09:00  97.3 F L  96  18  108/53 L  100


 


 02/08/21 08:00      98


 


 02/08/21 04:24  97.9 F  67  16  136/60  98








                                        











Admit Weight                   113 lb


 


Weight                         113 lb

















- Physical Exam


General: alert & oriented x3, appears well, no apparent distress


Neck: no bruit


Cardiac: regular rate and rhythm, no murmur


Lungs: clear to auscultation, normal breath sounds, no wheeze, rales, rhonchi


Neuro: grossly intact


Abdomen: active bowel sounds, soft, non-tender


Extremities: no cyanosis, no clubbing, no edema, 2+ Posterior Tibial, 2+ 

Dorsalis Pedus


Skin: clear


Musculoskeletal: normal range of motion, other (back pain, vertebral compression

fracture)





- Labs


Result Diagrams: 


                                 02/07/21 02:53





                                 02/08/21 10:43


                                  Troponin/CKMB











CK-MB (CK-2)  12.3 ng/mL (0-6.6)  H*  02/04/21  22:08    


 


Troponin I  1.126 ng/mL (< 0.028)  H*  02/07/21  02:53    














- EKG Interpretation


EKG Method: Telemetry


EKG: sinus rhythm





- Assessment/Plan


Assessment/Plan: 





1. Acute myocardial infarction: reprofused with Heparin, patient declined LHC at

this time 


2. Coronary artery disease: he is on Aspirin, Plavix, and Coreg, will lower dose

of Coreg due to hypotension


3. Vertebral compression fracture


4. Physical deconditioning: home with hospice tomorrow





From a cardiac stanpoint, Mr. Murrieta has declined heart catheterization at this

time. Will continue with Aspirin, Plavix and Coreg, we will sign off at this 

time. He can follow-up as an outpatient in 4 weeks. 








Pt. seen and eval. by me. I agree with the  A/P by the NP. I had a long 

discussion with him about a cardiac cath and he declines. I did tell him that if

he returns with another episode like this then he really would need a cath. I 

will sign off, if any cardiac issues I will be happy to see him again. At this 

time I will sign off. teresa

## 2021-02-09 VITALS — SYSTOLIC BLOOD PRESSURE: 130 MMHG | DIASTOLIC BLOOD PRESSURE: 67 MMHG

## 2021-02-09 VITALS — TEMPERATURE: 97.5 F

## 2021-02-09 RX ADMIN — ASPIRIN SCH MG: 81 TABLET ORAL at 09:11

## 2021-02-09 NOTE — PDOC.FMACP
Advance Care Planning





- Problem


(1) Palliative care encounter


Status: Acute   Code(s): Z51.5 - ENCOUNTER FOR PALLIATIVE CARE   





(2) NSTEMI (non-ST elevated myocardial infarction)


Status: Acute   Code(s): I21.4 - NON-ST ELEVATION (NSTEMI) MYOCARDIAL INFARCTION

  





(3) Weakness


Status: Acute   Code(s): R53.1 - WEAKNESS   





(4) Physical deconditioning


Status: Chronic   Code(s): R53.81 - OTHER MALAISE   





- Note


Participants: patient, family, palliative care


Summary: 


Palliative care has addresed Advanced Care Planning.  The diagnosis, prognosis 

and goals of care were discussed.  Appropriate forms and documentation to 

accomplish the goals of care were discussed.  All questions were answered. 





Confirmed DNAR


Home with Hospice/Elected to transition to home setting with [facility].


Family to also overlay with paid caregivers as needed


No further aggressive interventions/measures


OOHDNAR will need to be completed with family through Hospice, they are aware.

## 2021-02-09 NOTE — PDOC.DS.DS
Provider


Date of Admission: 


02/04/21 21:16





Date of Discharge: 02/09/21


Admitting Provider: 


Clifton Foster MD





Consultations: Cardiology


Primary Care Physician: 


NA LINDER MD








Course


Hospital Course: 


This is an 89-year-old male patient with a history of dementia, falls, back 

pain, coronary disease status post stent GERD and stroke who was transferred 

from his nursing home this afternoon account of chest pain.





Patient was also recently independent at home however he had a recent fall 

requiring back surgery and due to inability to stay independently was moved to a

nursing home after.  [facility].





In the nursing home  he complained of chest pain as a result of which EMS was 

activated.


EMS noted he had a large anterior and posterior MI status contacted ED and Cath 

Lab was activated.  He was started on heparin drip route however on presentation

chest pain had resolved and was relatively stable.  Cardiology was consulted and

was reevaluated.  Noted his EKG showed reperfusion and plan was to continue on 

medical management. 





Patient serology was positive for COVID-19 but without any pulmonary symptoms.





During his stay he was started on full dose Lovenox, Coreg, aspirin, Plavix.  He

was a bit hypotensive so Coreg was lowered, he was seen by cardiology and


patient declined LHC, I had a long discussion with his medical power of 

and since patient does not want any intervention it was best for him to go with 

home hospice.  I will provide him with prescription for Coreg and Plavix.


Resuscitation Status: 








02/04/21 21:08


Resuscitation Status Routine 


   Resuscitation Status: DNAR: NO Resuscitation


   Discussed with: Patient








Lab Results: 


                                        





                                 02/07/21 02:53 





                                 02/08/21 10:43 





Abnormal Lab Results - Last 48 hrs





02/08/21 10:43: Creatinine 0.61 L





Microbiology - Entire Visit





02/04/21 17:14   Urine Straight Catheter   Urine Culture - Final


                            NO GROWTH AT 48 HOURS


02/04/21 18:50   Venous blood - Left Hand   Blood Culture - Preliminary


                            NO GROWTH AT 48 HOURS


02/04/21 18:50   Venous blood - Right Arm   Blood Culture - Preliminary


                            NO GROWTH AT 48 HOURS








Vitals: 


                             Vital Signs (12 hours)











  Temp Pulse Resp BP Pulse Ox


 


 02/09/21 04:22  97.2 F L  75  18  146/82 H  100


 


 02/09/21 00:48  97.7 F  75  16  142/65 H  100








                                     Weight











Admit Weight                   113 lb


 


Weight                         113 lb














Physical Exam: 


The patient was seen and examined on the day of discharge.





General Appearance: NAD, awake alert


Eye: PERRL, anicteric sclera


ENT: normocephalic atraumatic, no oropharyngeal lesions


Neck: supple, symmetric, no JVD, no thyromegaly, no carotid bruit


Respiratory: CTAB, no wheezes, no rales, no ronchi


Cardiovascular: RRR, no murmur, no gallops, no rubs


Gastrointestinal: soft, non-tender, non-distended, normal bowel sounds


Extremities: no cyanosis, no clubbing


Skin: normal turgor


Neurological: cranial nerve grossly intact





Problem


(1) NSTEMI (non-ST elevated myocardial infarction)


Code(s): I21.4 - NON-ST ELEVATION (NSTEMI) MYOCARDIAL INFARCTION   Status: Acute

  





(2) CAD (coronary artery disease)


Code(s): I25.10 - ATHSCL HEART DISEASE OF NATIVE CORONARY ARTERY W/O ANG PCTRS  

Status: Chronic   





(3) Compression fracture of vertebra


Code(s): M48.50XA - COLLAPSED VERTEBRA, NEC, SITE UNSP, INIT   Status: Chronic  







(4) Physical deconditioning


Code(s): R53.81 - OTHER MALAISE   Status: Chronic   


Time Spent in discharge related activities (mins): 45





Plan


Prescriptions: 


Carvedilol [Coreg] 1.5625 mg PO BID-WM 30 Days #60 tab


Clopidogrel Bisulfate [Plavix] 75 mg PO DAILY 30 Days #30 tab


Home Medications: 


                                        











 Medication  Instructions  Recorded  Confirmed  Type


 


Acetaminophen W/ Codeine 1 tab PO Q4H PRN 02/05/21 02/05/21 History





[Acetaminophen/Codeine #3]    


 


Ascorbic Acid [Vitamin C] 1 tab PO DAILY 02/05/21 02/05/21 History


 


Cholecalciferol [Vitamin D3] 1 cap PO DAILY 02/05/21 02/05/21 History


 


Cyanocobalamin (Vitamin B-12) 2 tab PO DAILY 02/05/21 02/05/21 History





[B-12]    


 


Docusate [Colace] 1 cap PO DAILY 02/05/21 02/05/21 History


 


Lactobacillus [Floranex] 1 tab PO DAILY 02/05/21 02/05/21 History


 


Omeprazole 20 mg PO DAILY 02/05/21 02/05/21 History


 


Vitamin E 400 units PO DAILY 02/05/21 02/05/21 History


 


tiZANidine HCl [Tizanidine HCl] 4 mg PO Q6HR PRN 02/05/21 02/05/21 History


 


Aspirin [Ecotrin Low Strength] 81 mg PO DAILY  tab 02/09/21  Rx


 


Carvedilol [Coreg] 1.5625 mg PO BID-WM 30 Days #60 tab 02/09/21  Rx


 


Clopidogrel Bisulfate [Plavix] 75 mg PO DAILY 30 Days #30 tab 02/09/21  Rx











Allergies: 








cephalexin Allergy (Verified 03/23/20 01:52)


   


latex Allergy (Verified 03/23/20 01:52)


   


levofloxacin [From Levaquin] Allergy (Verified 03/23/20 01:52)


   


Penicillins Allergy (Verified 03/23/20 01:52)


   


Statins-Hmg-Coa Reductase Inhibitor Allergy (Verified 02/05/21 03:10)


   


tramadol Allergy (Verified 02/05/21 02:38)


   





Activity:: Activity as Tolerated


Referrals: 


NA LINDER [Primary Care Provider] - 


Disposition: HOSPICE-HOME





Quality


CORE MEASURES:: AMI


Did you prescribe antithrombotic therapy?: Yes


Did you prescribe anticoagulant for A Fib/Flutter?: No


Specify reason for no DC anticoagulant: Treatment not indicated


Did you prescribe a statin medication?: No


Specify reason for no DC statin medication: Statins not tolerated (Patient is 

allergic)

## 2021-02-12 NOTE — EKG
Test Reason : 

Blood Pressure : ***/*** mmHG

Vent. Rate : 066 BPM     Atrial Rate : 066 BPM

   P-R Int : 146 ms          QRS Dur : 098 ms

    QT Int : 426 ms       P-R-T Axes : 030 -27 074 degrees

   QTc Int : 446 ms

 

Normal sinus rhythm

Low voltage QRS

Inferior infarct , age undetermined

T wave abnormality, consider lateral ischemia

Abnormal ECG

When compared with ECG of 04-FEB-2021 17:32, (Unconfirmed)

No significant change was found

Confirmed by DR. HERMES JOHNSON ([id number]) on 2/12/2021 1:51:54 PM

 

Referred By:  ELIZABETH           Confirmed By:DR. HERMES JOHNSON